# Patient Record
(demographics unavailable — no encounter records)

---

## 2024-10-31 NOTE — DISCUSSION/SUMMARY
[Normal Growth] : growth [Normal Development] : development  [No Elimination Concerns] : elimination [Physical Growth and Development] : physical growth and development [Social and Academic Competence] : social and academic competence [Emotional Well-Being] : emotional well-being [Risk Reduction] : risk reduction [Violence and Injury Prevention] : violence and injury prevention [Patient] : patient [Not cleared] : Not cleared [I have examined the above-named student and completed the preparticipation physical evaluation. The athlete does not present apparent clinical contraindications to practice and participate in sport(s) as outlined above. A copy of the physical exam is on r] : I have examined the above-named student and completed the preparticipation physical evaluation. The athlete does not present apparent clinical contraindications to practice and participate in sport(s) as outlined above. A copy of the physical exam is on record in my office and can be made available to the school at the request of the parents. If conditions arise after the athlete has been cleared for participation, the physician may rescind the clearance until the problem is resolved and the potential consequences are completely explained to the athlete (and parents/guardians). [de-identified] : Need opthomology follow up for Left Myopia - reported saw 3 months ago.  [de-identified] : Well Adolescent not cleared pending opthomology followup and return documentation for clearance letter completion. 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 SA-sex one week ago w/condom; presently not on HBC; [facility]- negative. No complaints. Well Adolescent - not cleared pending Ophthalmology consult. Annual Summary Document sent home and reviewed. Anticipatory guidance addressed to include G&D, Safety, Sexuality, Nutrition etc. Left Myopia: (Screen OS- 20/100; OD- 20/15; OU 20/15) - Need Ophthalmology f/u and letter for clearance Need Immunization: Required UTD; need Influenza and Hep A #1 &#2. Consent/CIR/VIS sent home. Counseling on Health Promotion: Discussed HBC/STIs/Pregnancy and Testing for STI and HIV- patient presently refused and stated she will discuss with her mother.

## 2024-10-31 NOTE — RISK ASSESSMENT
[Little interest or pleasure doing things] : 1) Little interest or pleasure doing things [Feeling down, depressed, or hopeless] : 2) Feeling down, depressed, or hopeless [0] : 2) Feeling down, depressed, or hopeless: Not at all (0) [PHQ-2 Negative - No further assessment needed] : PHQ-2 Negative - No further assessment needed [[id number]] : 0 [Have you ever fainted, passed out or had an unexplained seizure suddenly and without warning, especially during exercise or in response] : Have you ever fainted, passed out or had an unexplained seizure suddenly and without warning, especially during exercise or in response to sudden loud noises such as doorbells, alarm clocks and ringing telephones? No [Have you ever had exercise-related chest pain or shortness of breath?] : Have you ever had exercise-related chest pain or shortness of breath? No [Has anyone in your immediate family (parents, grandparents, siblings) or other more distant relatives (aunts, uncles, cousins)  of heart] : Has anyone in your immediate family (parents, grandparents, siblings) or other more distant relatives (aunts, uncles, cousins)  of heart problems or had an unexpected sudden death before age 50 (This would include unexpected drownings, unexplained car accidents in which the relative was driving or sudden infant death syndrome.)? No [Are you related to anyone with hypertrophic cardiomyopathy or hypertrophic obstructive cardiomyopathy, Marfan syndrome, arrhythmogenic] : Are you related to anyone with hypertrophic cardiomyopathy or hypertrophic obstructive cardiomyopathy, Marfan syndrome, arrhythmogenic right ventricular cardiomyopathy, long QT syndrome, short QT syndrome, Brugada syndrome or catecholaminergic polymorphic ventricular tachycardia, or anyone younger than 50 years with a pacemaker or implantable defibrillator? No

## 2024-10-31 NOTE — PHYSICAL EXAM

## 2024-10-31 NOTE — HISTORY OF PRESENT ILLNESS
[Up to date] : Up to date [Normal] : normal [LMP: _____] : LMP: [unfilled] [Days of Bleeding: _____] : Days of bleeding: [unfilled] [Age of Menarche: ____] : Age of Menarche: [unfilled] [Irregular menses] : irregular menses [Heavy Bleeding] : heavy bleeding [Painful Cramps] : painful cramps [Hirsutism] : hirsutism [Acne] : acne [Eats meals with family] : eats meals with family [Has family members/adults to turn to for help] : has family members/adults to turn to for help [Is permitted and is able to make independent decisions] : Is permitted and is able to make independent decisions [Grade: ____] : Grade: [unfilled] [Normal Performance] : normal performance [Normal Behavior/Attention] : normal behavior/attention [Normal Homework] : normal homework [Eats regular meals including adequate fruits and vegetables] : eats regular meals including adequate fruits and vegetables [Drinks non-sweetened liquids] : drinks non-sweetened liquids  [Calcium source] : calcium source [Has friends] : has friends [At least 1 hour of physical activity a day] : at least 1 hour of physical activity a day [Screen time (except homework) less than 2 hours a day] : screen time (except homework) less than 2 hours a day [Has interests/participates in community activities/volunteers] : has interests/participates in community activities/volunteers. [Uses electronic nicotine delivery system] : uses electronic nicotine delivery system [No] : No cigarette smoke exposure [Uses safety belts/safety equipment] : uses safety belts/safety equipment  [Has peer relationships free of violence] : has peer relationships free of violence [Yes] : Patient has had sexual intercourse. [Age of 1st Sexual [place]: ____] : Age of 1st sexual intercourse: [unfilled]  [Date of Most Recent Sexual Encounter: ____] : Date of most recent sexual encounter: [unfilled] [Sometimes] : Condom use: sometimes [Vaginal] : vaginal [Male ___] : # of lifetime male partners: [unfilled] [Sexual Orientation: _______] : [unfilled] [Gender Identification: _______] : [unfilled] [HIV Screening Declined] : HIV Screening Declined [Has ways to cope with stress] : has ways to cope with stress [Displays self-confidence] : displays self-confidence [With Teen] : teen [NO] : No [Tampon Use] : no tampon use [Sleep Concerns] : no sleep concerns [Has concerns about body or appearance] : does not have concerns about body or appearance [Exposure to electronic nicotine delivery system] : no exposure to electronic nicotine delivery system [Uses tobacco] : does not use tobacco [Exposure to tobacco] : no exposure to tobacco [Uses drugs] : does not use drugs  [Exposure to drugs] : no exposure to drugs [Drinks alcohol] : does not drink alcohol [Exposure to alcohol] : no exposure to alcohol [Impaired/distracted driving] : no impaired/distracted driving [History of Sexual Abuse] : no history of sexual abuse [History of STI] : no history of STI [History of Pregnancy] : no history of pregnancy [Has problems with sleep] : does not have problems with sleep [Gets depressed, anxious, or irritable/has mood swings] : does not get depressed, anxious, or irritable/has mood swings [Has thought about hurting self or considered suicide] : has not thought about hurting self or considered suicide [de-identified] : self [FreeTextEntry7] : none  [de-identified] : none [de-identified] : need check up [de-identified] : Require; need Influenza, Hep A 1/2 [FreeTextEntry8] : relief w/ Ibuprofen and Midol [de-identified] : trying to stop, used maybe 1-2 month [FreeTextEntry1] : 18 y/o F 11th grader new to school here for working paper clearance. SA-sex one week ago w/condom; presently not on HBC; [facility]- negative. No complaints

## 2024-12-16 NOTE — PHYSICAL EXAM
[EOMI] : grossly EOMI [Conjuctival Injection] : no conjunctival injection [Allergic Shiners] : no allergic shiners [Pink Nasal Mucosa] : pink nasal mucosa [Clear Rhinorrhea] : clear rhinorrhea [Nasal Flaring] : no nasal flaring [Erythematous Oropharynx] : nonerythematous oropharynx [Enlarged Tonsils] : tonsils not enlarged [Exudate] : no exudate [NL] : regular rate and rhythm, normal S1, S2 audible, no murmurs [Normotonic] : normotonic [Warm] : warm [Clear] : clear

## 2024-12-16 NOTE — DISCUSSION/SUMMARY
[FreeTextEntry1] : Pt presented to [facility] with nasal congestion, headache, cough and sore throat x 4 days. All of the symptoms are said to have improved. Other family members are said to be suffering from similar symptoms. Viral testing not performed considering length of illness and no comorbidities Acetaminophen and lozenges dispensed for immediate symptomatic relief. Pt tolerated Supportive care advised. Pt verbalized understanding Safe discharge to class with face mask on F/u as needed

## 2024-12-16 NOTE — HISTORY OF PRESENT ILLNESS
[de-identified] : Runny nose, cough, sore throat, and headache x 4 days [FreeTextEntry6] : Pt presented to [facility] for cold-like symptoms x 4 days- now improving

## 2025-03-05 NOTE — HISTORY OF PRESENT ILLNESS
[FreeTextEntry6] : Patient is a 18 yo F who presents to the [facility] for sore throat and body aches since last night.   Denies n/v, nasal congestion, fever, diarrhea, abdominal pain, chest pain, shortness breath, cough, rash, or recent travel. Sick contacts include her mother's friend who was visiting and tested positive for Covid. Patient had contact with this person from 2/28/25-3/3/25   She has not taken any medication for her symptoms since onset Eating and drinking at baseline

## 2025-03-05 NOTE — PHYSICAL EXAM
[Symmetric Chest Wall] : symmetric chest wall [NL] : supple, full passive range of motion [Tenderness] : no tenderness [Laceration] : no laceration [Ecchymosis] : no ecchymosis [Swelling] : no swelling [Traumatic] : atraumatic [Erythematous Oropharynx] : nonerythematous oropharynx [Cobblestoning] : no cobblestoning of posterior pharynx [Inflamed Gingiva] : gingiva not inflamed [Bleeding Gingiva] : gingiva not bleeding [Inflamed Tongue] : tongue not inflamed [Enlarged Tonsils] : tonsils not enlarged [Vesicles] : no vesicles [Exudate] : no exudate [Ulcerative Lesions] : no ulcerative lesions [Palate petechiae] : palate without petechiae [Tenderness With Palpation of Chest Wall] : no tenderness with palpation of chest wall [de-identified] : Multiple Cavities, healed tongue ring no hearing difficulty/no vertigo/no nasal congestion/no nasal discharge/no throat pain

## 2025-03-05 NOTE — PHYSICAL EXAM
[Symmetric Chest Wall] : symmetric chest wall [NL] : supple, full passive range of motion [Tenderness] : no tenderness [Laceration] : no laceration [Ecchymosis] : no ecchymosis [Swelling] : no swelling [Traumatic] : atraumatic [Erythematous Oropharynx] : nonerythematous oropharynx [Cobblestoning] : no cobblestoning of posterior pharynx [Inflamed Gingiva] : gingiva not inflamed [Bleeding Gingiva] : gingiva not bleeding [Inflamed Tongue] : tongue not inflamed [Enlarged Tonsils] : tonsils not enlarged [Vesicles] : no vesicles [Exudate] : no exudate [Ulcerative Lesions] : no ulcerative lesions [Palate petechiae] : palate without petechiae [Tenderness With Palpation of Chest Wall] : no tenderness with palpation of chest wall [de-identified] : Multiple Cavities, healed tongue ring

## 2025-03-05 NOTE — HISTORY OF PRESENT ILLNESS
[FreeTextEntry6] : Patient is a 18 yo F who presents to the [facility] for sore throat and body aches since last night.   Denies n/v, nasal congestion, fever, diarrhea, abdominal pain, chest pain, shortness breath, cough, rash, or recent travel. Sick contacts include her mother's friend who was visiting and tested positive for Covid. Patient had contact with this person from 2/28/25-3/3/25   She has not taken any medication for her symptoms since onset Eating and drinking at baseline

## 2025-03-05 NOTE — DISCUSSION/SUMMARY
[FreeTextEntry1] : Patient is a 16 yo F who presents to the [facility] for sore throat and body aches since last night.   Denies n/v, nasal congestion, fever, diarrhea, abdominal pain, chest pain, shortness breath, cough, rash, or recent travel. Sick contacts include her mother's friend who was visiting and tested positive for Covid. Patient had contact with this person from 2/28/25-3/3/25   She has not taken any medication for her symptoms since onset Eating and drinking at baseline   Plan: -COVID/Influenza/RSV PCR completed. -(2) Tylenol 325 mg given PO, tolerated and (3) sore throat lozenges dispensed -Outreach completed to patients mother Ms. Lemon at [phone] on visit and plan of care.  -Educated patient and mother on supportive care and for new, persistent, or worsening symptoms to RTC, UC or PMD and they verbalized understanding.  -Encouraged to verbalize any questions or concerns, all questions answered at this time  Dental Caries -Patient reports mother is aware and will be make a dental appointment for her -Educated on dental hygiene  VIS/Consent given for Covid/Influenza/and Hep A

## 2025-03-05 NOTE — DISCUSSION/SUMMARY
[FreeTextEntry1] : Patient is a 16 yo F who presents to the [facility] for sore throat and body aches since last night.   Denies n/v, nasal congestion, fever, diarrhea, abdominal pain, chest pain, shortness breath, cough, rash, or recent travel. Sick contacts include her mother's friend who was visiting and tested positive for Covid. Patient had contact with this person from 2/28/25-3/3/25   She has not taken any medication for her symptoms since onset Eating and drinking at baseline   Plan: -COVID/Influenza/RSV PCR completed. -(2) Tylenol 325 mg given PO, tolerated and (3) sore throat lozenges dispensed -Outreach completed to patients mother Ms. Lemon at [phone] on visit and plan of care.  -Educated patient and mother on supportive care and for new, persistent, or worsening symptoms to RTC, UC or PMD and they verbalized understanding.  -Encouraged to verbalize any questions or concerns, all questions answered at this time  Dental Caries -Patient reports mother is aware and will be make a dental appointment for her -Educated on dental hygiene  VIS/Consent given for Covid/Influenza/and Hep A

## 2025-03-05 NOTE — REVIEW OF SYSTEMS
[Negative] : Genitourinary [Sore Throat] : sore throat [Fever] : no fever [Chills] : no chills [Malaise] : no malaise [Difficulty with Sleep] : no difficulty with sleep [Change in Weight] : no change in weight [Night Sweats] : no night sweats [Headache] : no headache [Eye Discharge] : no eye discharge [Eye Redness] : no eye redness [Itchy Eyes] : no itchy eyes [Changes in Vision] : no changes in vision [Ear Pain] : no ear pain [Nasal Discharge] : no nasal discharge [Nasal Congestion] : no nasal congestion [Snoring] : no snoring [Sinus Pressure] : no sinus pressure

## 2025-04-03 NOTE — DISCUSSION/SUMMARY
[FreeTextEntry1] : Patient is a 16 yo F who presents to the [facility] for a pregnancy test as she reports she had 2 positive urine pregnancy tests at home.   Last vaginal SA 3/14/25 with male partner Denies any anal or oral SA ever Reports all SA is consensual, sex trafficking, or trading sex for money or food.  [place] 15 yo  4 Lifetime partners, all male. Current partner 19 yo M, patient states she feel safe in relationship Condoms sometimes  Patient reports she think she had a menstrual cycle over the weekend 3/28/25-3/31/25.  Flow heavy to light then spotting.  Denies any /GI symptoms today.   Plan:  Sexually Active  -Will complete serum HCG today as patient reports 2 positive pregnancy tests at home. Urine HCG at [facility] negative today -Offered STI testing and patient accepted. GC/CT, HIV, Syphilis, Trichomonas completed -Educated on consistent condom use for STI/ pregnancy prevention, condoms offered and accepted. -Educated patient and educational handouts provided on EC In Advance and BCMs. Patient is unsure of what her goals are (seeking or not seeking pregnancy). Supportive provided. Declines EC In Advance today. -Patient will RTC tomorrow for f/u labs and further discussed on BCMs with provider and [facility] Educator -Encouraged to verbalize any questions or concerns, all questions answered at this time.

## 2025-04-03 NOTE — HISTORY OF PRESENT ILLNESS
[FreeTextEntry6] : Patient is a 18 yo F who presents to the [facility] for a pregnancy test as she reports she had 2 positive urine pregnancy tests at home.   Last vaginal SA 3/14/25 with male partner Denies any anal or oral SA ever Reports all SA is consensual, sex trafficking, or trading sex for money or food.  [place] 15 yo  4 Lifetime partners, all male. Current partner 19 yo M, patient states she feel safe in relationship Condoms sometimes  Patient reports she think she had a menstrual cycle over the weekend 3/28/25-3/31/25.  Flow heavy to light then spotting.  Denies any /GI symptoms today.